# Patient Record
Sex: MALE | Race: WHITE | NOT HISPANIC OR LATINO | Employment: OTHER | URBAN - METROPOLITAN AREA
[De-identification: names, ages, dates, MRNs, and addresses within clinical notes are randomized per-mention and may not be internally consistent; named-entity substitution may affect disease eponyms.]

---

## 2017-04-10 ENCOUNTER — HOSPITAL ENCOUNTER (OUTPATIENT)
Dept: CARDIOLOGY | Facility: HOSPITAL | Age: 62
Discharge: HOME | End: 2017-04-10
Attending: INTERNAL MEDICINE | Admitting: INTERNAL MEDICINE

## 2018-09-12 ENCOUNTER — HOSPITAL ENCOUNTER (OUTPATIENT)
Dept: CARDIOLOGY | Facility: HOSPITAL | Age: 63
Discharge: HOME | End: 2018-09-12
Attending: INTERNAL MEDICINE | Admitting: INTERNAL MEDICINE

## 2018-10-07 ENCOUNTER — HOSPITAL ENCOUNTER (OUTPATIENT)
Dept: SLEEP MEDICINE | Facility: HOSPITAL | Age: 63
Discharge: HOME | End: 2018-10-07
Attending: INTERNAL MEDICINE | Admitting: INTERNAL MEDICINE

## 2019-10-04 PROBLEM — G45.3 AMAUROSIS FUGAX: Status: ACTIVE | Noted: 2017-09-15

## 2019-10-04 PROBLEM — R94.39 ABNORMAL CARDIOVASCULAR STRESS TEST: Status: ACTIVE | Noted: 2017-04-11

## 2019-10-04 PROBLEM — E78.5 HYPERLIPIDEMIA: Status: ACTIVE | Noted: 2017-06-01

## 2019-10-04 PROBLEM — I25.10 CHRONIC CORONARY ARTERY DISEASE: Status: ACTIVE | Noted: 2017-04-21

## 2019-10-04 PROBLEM — I10 HYPERTENSION: Status: ACTIVE | Noted: 2017-06-01

## 2019-10-04 PROBLEM — G47.30 SLEEP APNEA: Status: ACTIVE | Noted: 2019-10-04

## 2019-10-04 PROBLEM — J44.9 CHRONIC OBSTRUCTIVE PULMONARY DISEASE (HCC): Status: ACTIVE | Noted: 2019-10-04

## 2019-10-04 RX ORDER — BUDESONIDE 0.5 MG/2ML
INHALANT ORAL
COMMUNITY
Start: 2013-04-29 | End: 2019-10-07

## 2019-10-04 RX ORDER — NITROGLYCERIN 0.4 MG/1
TABLET SUBLINGUAL
COMMUNITY
Start: 2017-03-10

## 2019-10-04 RX ORDER — ALBUTEROL SULFATE 90 UG/1
AEROSOL, METERED RESPIRATORY (INHALATION)
COMMUNITY
Start: 2017-03-10

## 2019-10-04 RX ORDER — ALBUTEROL SULFATE 2.5 MG/3ML
SOLUTION RESPIRATORY (INHALATION)
COMMUNITY
Start: 2015-12-10

## 2019-10-04 NOTE — PROGRESS NOTES
Subjective:     Encounter Date:10/07/2019      Patient ID: Hemanth Hudson is a 64 y.o. male.    Chief Complaint: Follow-up HTN & Hyperlipidemia  History of Present Illness     64-year-old white male with history of hyperlipidemia hypertension obstructive sleep apnea COPD tobacco abuse presented to my office for a follow-up.       cardiac catheterization April 2017,  which showed a 50-60% disease the circumflex artery and a 99% disease in the RCA.  Patient thereafter underwent a stent placement to the distal RCA with a drug-eluting stent.       Unfortunately patient stopped aspirin and Plavix completely and refused to take them      EKG showed sinus rhythm right bundle branch block     Patient was advised to stop smoking and lose weight for blood pressure control  Patient refusing to take any blood pressure medicines at this time  Patient lost 25 lb   blood pressure is reasonably controlled unfortunately continues to smoke so i again strongly advised him to stop smoking   I again advised him to take aspirin and Plavix he fully understand the stent thrombosis sudden cardiac death myocardial infarction      No past medical history on file.  No past surgical history on file.  There were no vitals taken for this visit.  No family history on file.    Current Outpatient Medications:   •  albuterol (PROVENTIL) (2.5 MG/3ML) 0.083% nebulizer solution, ALBUTEROL SULFATE (2.5 MG/3ML) 0.083% NEBU, Disp: , Rfl:   •  albuterol sulfate HFA (VENTOLIN HFA) 108 (90 Base) MCG/ACT inhaler, VENTOLIN  (90 Base) MCG/ACT AERS, Disp: , Rfl:   •  budesonide (PULMICORT) 0.5 MG/2ML nebulizer solution, BUDESONIDE 0.5 MG/2ML SUSP, Disp: , Rfl:   •  ipratropium (ATROVENT) 0.02 % nebulizer solution, IPRATROPIUM BROMIDE 0.02 % SOLN, Disp: , Rfl:   •  nitroglycerin (NITROSTAT) 0.4 MG SL tablet, NITROSTAT 0.4 MG SUBL, Disp: , Rfl:   •  umeclidinium-vilanterol (ANORO ELLIPTA) 62.5-25 MCG/INH aerosol powder  inhaler, ANORO ELLIPTA 62.5-25  MCG/INH AEPB, Disp: , Rfl:   Social History     Socioeconomic History   • Marital status:      Spouse name: Not on file   • Number of children: Not on file   • Years of education: Not on file   • Highest education level: Not on file     Allergies   Allergen Reactions   • Diphenhydramine Unknown (See Comments)   • Fish Allergy Unknown (See Comments)   • Morphine Unknown (See Comments)   • Pineapple Unknown (See Comments)     ROS           Objective:     Physical Exam    Procedures    Lab Review:       Assessment:         No diagnosis found.       Plan:

## 2019-10-07 ENCOUNTER — OFFICE VISIT (OUTPATIENT)
Dept: CARDIOLOGY | Facility: CLINIC | Age: 64
End: 2019-10-07

## 2019-10-07 VITALS
HEART RATE: 72 BPM | OXYGEN SATURATION: 93 % | BODY MASS INDEX: 38.22 KG/M2 | SYSTOLIC BLOOD PRESSURE: 157 MMHG | HEIGHT: 71 IN | DIASTOLIC BLOOD PRESSURE: 75 MMHG | WEIGHT: 273 LBS

## 2019-10-07 DIAGNOSIS — E78.2 MIXED HYPERLIPIDEMIA: ICD-10-CM

## 2019-10-07 DIAGNOSIS — I10 ESSENTIAL HYPERTENSION: ICD-10-CM

## 2019-10-07 DIAGNOSIS — Z91.14 NON COMPLIANCE W MEDICATION REGIMEN: ICD-10-CM

## 2019-10-07 DIAGNOSIS — I25.10 CHRONIC CORONARY ARTERY DISEASE: Primary | ICD-10-CM

## 2019-10-07 PROCEDURE — 99214 OFFICE O/P EST MOD 30 MIN: CPT | Performed by: INTERNAL MEDICINE

## 2019-10-07 NOTE — PROGRESS NOTES
"    Subjective:     Encounter Date:10/07/2019      Patient ID: Hemanth Hudson is a 64 y.o. male.    Chief Complaint: Follow-up for HTN & CAD  History of Present Illness     64-year-old white male with history of hyperlipidemia hypertension obstructive sleep apnea COPD tobacco abuse presented to my office for a follow-up.       cardiac catheterization April 2017,  which showed a 50-60% disease the circumflex artery and a 99% disease in the RCA.  Patient thereafter underwent a stent placement to the distal RCA with a drug-eluting stent.       Unfortunately patient stopped aspirin and Plavix completely and refused to take them          Patient was advised to stop smoking and lose weight for blood pressure control  Patient refusing to take any blood pressure medicines at this time     blood pressure is reasonably controlled unfortunately continues to smoke so i again strongly advised him to stop smoking   I again advised him to take aspirin and Plavix he fully understand the stent thrombosis sudden cardiac death myocardial infarction      Past Medical History:   Diagnosis Date   • COPD (chronic obstructive pulmonary disease) (CMS/HCC)    • Coronary artery disease    • Depression    • Hypersomnia    • Hypertension    • Neck and shoulder pain    • Osteoarthritis    • Sleep apnea      Past Surgical History:   Procedure Laterality Date   • APPENDECTOMY     • CARDIAC CATHETERIZATION  08/06/2012   • COLONOSCOPY     • CORONARY STENT PLACEMENT  04/21/2017    RCA   • HERNIA REPAIR     • PROSTATE SURGERY     • WRIST SURGERY Right      /75 (BP Location: Left arm, Patient Position: Sitting, Cuff Size: Adult)   Pulse 72   Ht 180.3 cm (71\")   Wt 124 kg (273 lb)   SpO2 93%   BMI 38.08 kg/m²   Family History   Problem Relation Age of Onset   • Aneurysm Mother    • Heart attack Father    • Heart disease Father    • No Known Problems Sister    • No Known Problems Brother    • No Known Problems Maternal Aunt    • No Known " Problems Maternal Uncle    • No Known Problems Paternal Aunt    • No Known Problems Paternal Uncle    • No Known Problems Maternal Grandmother    • Cancer Maternal Grandfather    • No Known Problems Paternal Grandmother    • No Known Problems Paternal Grandfather    • No Known Problems Other    • Anemia Neg Hx    • Arrhythmia Neg Hx    • Asthma Neg Hx    • Clotting disorder Neg Hx    • Fainting Neg Hx    • Heart failure Neg Hx    • Hyperlipidemia Neg Hx    • Hypertension Neg Hx        Current Outpatient Medications:   •  albuterol (PROVENTIL) (2.5 MG/3ML) 0.083% nebulizer solution, ALBUTEROL SULFATE (2.5 MG/3ML) 0.083% NEBU, Disp: , Rfl:   •  albuterol sulfate HFA (VENTOLIN HFA) 108 (90 Base) MCG/ACT inhaler, VENTOLIN  (90 Base) MCG/ACT AERS, Disp: , Rfl:   •  ipratropium (ATROVENT) 0.02 % nebulizer solution, IPRATROPIUM BROMIDE 0.02 % SOLN, Disp: , Rfl:   •  nitroglycerin (NITROSTAT) 0.4 MG SL tablet, NITROSTAT 0.4 MG SUBL, Disp: , Rfl:   •  umeclidinium-vilanterol (ANORO ELLIPTA) 62.5-25 MCG/INH aerosol powder  inhaler, ANORO ELLIPTA 62.5-25 MCG/INH AEPB, Disp: , Rfl:   Social History     Socioeconomic History   • Marital status:      Spouse name: Not on file   • Number of children: Not on file   • Years of education: Not on file   • Highest education level: Not on file   Tobacco Use   • Smoking status: Current Every Day Smoker     Packs/day: 0.50     Types: Cigarettes   • Smokeless tobacco: Never Used   Substance and Sexual Activity   • Alcohol use: Yes   • Drug use: Yes   • Sexual activity: Defer     Allergies   Allergen Reactions   • Diphenhydramine Unknown (See Comments)   • Fish Allergy Unknown (See Comments)   • Morphine Unknown (See Comments)   • Pineapple Unknown (See Comments)     Review of Systems   Constitution: Negative for fever and malaise/fatigue.   HENT: Negative for congestion and hearing loss.    Eyes: Negative for double vision and visual disturbance.   Cardiovascular: Negative  for chest pain, claudication, dyspnea on exertion, leg swelling and syncope.   Respiratory: Negative for cough and shortness of breath.    Endocrine: Negative for cold intolerance.   Skin: Negative for color change and rash.   Musculoskeletal: Negative for arthritis and joint pain.   Gastrointestinal: Negative for abdominal pain and heartburn.   Genitourinary: Negative for hematuria.   Neurological: Negative for excessive daytime sleepiness and dizziness.   Psychiatric/Behavioral: Negative for depression. The patient is not nervous/anxious.    All other systems reviewed and are negative.             Objective:     Physical Exam   Constitutional: He is oriented to person, place, and time. He appears well-developed and well-nourished. He is cooperative.   Obese   HENT:   Head: Normocephalic and atraumatic.   Mouth/Throat: Uvula is midline and oropharynx is clear and moist. No oral lesions.   Eyes: Conjunctivae are normal. No scleral icterus.   Neck: Trachea normal. Neck supple. No JVD present. Carotid bruit is not present. No thyromegaly present.   Cardiovascular: Normal rate, regular rhythm, S1 normal, S2 normal, normal heart sounds, intact distal pulses and normal pulses. PMI is not displaced. Exam reveals no gallop and no friction rub.   No murmur heard.  Pulmonary/Chest: Effort normal and breath sounds normal.   Abdominal: Soft. Bowel sounds are normal.   Musculoskeletal: Normal range of motion.   Neurological: He is alert and oriented to person, place, and time. He has normal strength.   No focal deficits   Skin: Skin is warm. No cyanosis.   Psychiatric: He has a normal mood and affect.       Procedures    Lab Review:       Assessment:          Diagnosis Plan   1. Chronic coronary artery disease     2. Mixed hyperlipidemia     3. Essential hypertension     4. Non compliance w medication regimen            Plan:       Noncompliance with medical care  Patient fully understand all the risks including sudden cardiac  death  Hypertension and dyslipidemia needs aggressive control  I again discussed with the patient at length about the importance of taking medications  He fully understand all the risks

## 2020-08-18 ENCOUNTER — TELEPHONE (OUTPATIENT)
Dept: SURGERY | Facility: CLINIC | Age: 65
End: 2020-08-18

## 2020-08-18 NOTE — TELEPHONE ENCOUNTER
Pt was scheduled for an appointment with  for GERD . Pt stated on telephone when making the appt that he was unable to wear a mask due to breathing problems. We told patient that he would be able to wear a face shield. Upon arriving patient would not put face shield on and was holding it in front of his face.I let pt know that he would have to put face shield on in order to be seen in our office. He declined , I let him know that we would have to cancel his appointment today .     Voiced understanding and patient's appt was cancelled.

## 2021-06-20 ENCOUNTER — HOSPITAL ENCOUNTER (EMERGENCY)
Facility: HOSPITAL | Age: 66
Discharge: HOME | End: 2021-06-20
Attending: EMERGENCY MEDICINE | Admitting: EMERGENCY MEDICINE

## 2021-06-20 ENCOUNTER — APPOINTMENT (OUTPATIENT)
Dept: GENERAL RADIOLOGY | Facility: HOSPITAL | Age: 66
End: 2021-06-20

## 2021-06-20 VITALS
DIASTOLIC BLOOD PRESSURE: 79 MMHG | RESPIRATION RATE: 24 BRPM | WEIGHT: 260 LBS | SYSTOLIC BLOOD PRESSURE: 108 MMHG | BODY MASS INDEX: 36.4 KG/M2 | HEART RATE: 67 BPM | TEMPERATURE: 97.9 F | HEIGHT: 71 IN | OXYGEN SATURATION: 94 %

## 2021-06-20 DIAGNOSIS — R07.9 CHEST PAIN, UNSPECIFIED TYPE: Primary | ICD-10-CM

## 2021-06-20 LAB
ANION GAP SERPL CALCULATED.3IONS-SCNC: 9 MMOL/L (ref 5–15)
BASOPHILS # BLD AUTO: 0.1 10*3/MM3 (ref 0–0.2)
BASOPHILS NFR BLD AUTO: 0.9 % (ref 0–1.5)
BUN SERPL-MCNC: 15 MG/DL (ref 8–23)
BUN/CREAT SERPL: 13 (ref 7–25)
CALCIUM SPEC-SCNC: 8.6 MG/DL (ref 8.6–10.5)
CHLORIDE SERPL-SCNC: 102 MMOL/L (ref 98–107)
CO2 SERPL-SCNC: 27 MMOL/L (ref 22–29)
CREAT SERPL-MCNC: 1.15 MG/DL (ref 0.76–1.27)
DEPRECATED RDW RBC AUTO: 45.1 FL (ref 37–54)
EOSINOPHIL # BLD AUTO: 0 10*3/MM3 (ref 0–0.4)
EOSINOPHIL NFR BLD AUTO: 0.5 % (ref 0.3–6.2)
ERYTHROCYTE [DISTWIDTH] IN BLOOD BY AUTOMATED COUNT: 15.9 % (ref 12.3–15.4)
GFR SERPL CREATININE-BSD FRML MDRD: 64 ML/MIN/1.73
GLUCOSE SERPL-MCNC: 108 MG/DL (ref 65–99)
HCT VFR BLD AUTO: 41.2 % (ref 37.5–51)
HGB BLD-MCNC: 13.8 G/DL (ref 13–17.7)
HOLD SPECIMEN: NORMAL
LYMPHOCYTES # BLD AUTO: 1.9 10*3/MM3 (ref 0.7–3.1)
LYMPHOCYTES NFR BLD AUTO: 20.3 % (ref 19.6–45.3)
MCH RBC QN AUTO: 26.8 PG (ref 26.6–33)
MCHC RBC AUTO-ENTMCNC: 33.4 G/DL (ref 31.5–35.7)
MCV RBC AUTO: 80.4 FL (ref 79–97)
MONOCYTES # BLD AUTO: 0.8 10*3/MM3 (ref 0.1–0.9)
MONOCYTES NFR BLD AUTO: 8.8 % (ref 5–12)
NEUTROPHILS NFR BLD AUTO: 6.5 10*3/MM3 (ref 1.7–7)
NEUTROPHILS NFR BLD AUTO: 69.5 % (ref 42.7–76)
NRBC BLD AUTO-RTO: 0.1 /100 WBC (ref 0–0.2)
PLATELET # BLD AUTO: 294 10*3/MM3 (ref 140–450)
PMV BLD AUTO: 6.8 FL (ref 6–12)
POTASSIUM SERPL-SCNC: 4.6 MMOL/L (ref 3.5–5.2)
RBC # BLD AUTO: 5.13 10*6/MM3 (ref 4.14–5.8)
SODIUM SERPL-SCNC: 138 MMOL/L (ref 136–145)
TROPONIN T SERPL-MCNC: <0.01 NG/ML (ref 0–0.03)
WBC # BLD AUTO: 9.3 10*3/MM3 (ref 3.4–10.8)

## 2021-06-20 PROCEDURE — 84484 ASSAY OF TROPONIN QUANT: CPT | Performed by: EMERGENCY MEDICINE

## 2021-06-20 PROCEDURE — 85025 COMPLETE CBC W/AUTO DIFF WBC: CPT | Performed by: EMERGENCY MEDICINE

## 2021-06-20 PROCEDURE — 80048 BASIC METABOLIC PNL TOTAL CA: CPT | Performed by: EMERGENCY MEDICINE

## 2021-06-20 PROCEDURE — 93005 ELECTROCARDIOGRAM TRACING: CPT | Performed by: EMERGENCY MEDICINE

## 2021-06-20 PROCEDURE — 99283 EMERGENCY DEPT VISIT LOW MDM: CPT

## 2021-06-20 PROCEDURE — 71045 X-RAY EXAM CHEST 1 VIEW: CPT

## 2021-06-20 PROCEDURE — 25010000002 HYDROMORPHONE PER 4 MG: Performed by: EMERGENCY MEDICINE

## 2021-06-20 PROCEDURE — 93005 ELECTROCARDIOGRAM TRACING: CPT

## 2021-06-20 PROCEDURE — 96374 THER/PROPH/DIAG INJ IV PUSH: CPT

## 2021-06-20 RX ORDER — SODIUM CHLORIDE 0.9 % (FLUSH) 0.9 %
10 SYRINGE (ML) INJECTION AS NEEDED
Status: DISCONTINUED | OUTPATIENT
Start: 2021-06-20 | End: 2021-06-20 | Stop reason: HOSPADM

## 2021-06-20 RX ORDER — HYDROMORPHONE HCL 110MG/55ML
0.5 PATIENT CONTROLLED ANALGESIA SYRINGE INTRAVENOUS ONCE
Status: COMPLETED | OUTPATIENT
Start: 2021-06-20 | End: 2021-06-20

## 2021-06-20 RX ADMIN — HYDROMORPHONE HYDROCHLORIDE 0.5 MG: 2 INJECTION, SOLUTION INTRAMUSCULAR; INTRAVENOUS; SUBCUTANEOUS at 10:45

## 2021-06-20 NOTE — ED TRIAGE NOTES
"Pt reports lt shoulder pain \"for awhile\" pt denies any injury/trauma. Does report lt side chest pain with deep breathing that started last night, this is intermittent.  "

## 2021-06-20 NOTE — ED PROVIDER NOTES
Subjective   Patient is a 66-year-old male complaint of sharp pain in his left chest rating to his shoulder.  This developed over the past several days.  The pain is constant and mild without other radiation.  He denies cough fever or increase shortness of breath.  He denies other complaints.          Review of Systems  Negative for headache earache throat cough fever shortness of breath vomiting diarrhea dysuria achiness weight loss or other complaint.  A complete view of systems was obtained and is otherwise negative  Past Medical History:   Diagnosis Date   • COPD (chronic obstructive pulmonary disease) (CMS/[facility])    • Coronary artery disease    • Depression    • Hypersomnia    • Hypertension    • Neck and shoulder pain    • Osteoarthritis    • Sleep apnea        Allergies   Allergen Reactions   • Diphenhydramine Unknown (See Comments)   • Fish Allergy Unknown (See Comments)   • Morphine Unknown (See Comments)   • Pineapple Unknown (See Comments)   • Shellfish-Derived Products GI Intolerance       Past Surgical History:   Procedure Laterality Date   • APPENDECTOMY     • CARDIAC CATHETERIZATION  08/06/2012   • COLONOSCOPY     • CORONARY STENT PLACEMENT  04/21/2017    RCA   • HERNIA REPAIR     • PROSTATE SURGERY     • WRIST SURGERY Right        Family History   Problem Relation Age of Onset   • Aneurysm Mother    • Heart attack Father    • Heart disease Father    • No Known Problems Sister    • No Known Problems Brother    • No Known Problems Maternal Aunt    • No Known Problems Maternal Uncle    • No Known Problems Paternal Aunt    • No Known Problems Paternal Uncle    • No Known Problems Maternal Grandmother    • Cancer Maternal Grandfather    • No Known Problems Paternal Grandmother    • No Known Problems Paternal Grandfather    • No Known Problems Other    • Anemia Neg Hx    • Arrhythmia Neg Hx    • Asthma Neg Hx    • Clotting disorder Neg Hx    • Fainting Neg Hx    • Heart failure Neg Hx    • Hyperlipidemia Neg  Hx    • Hypertension Neg Hx        Social History     Socioeconomic History   • Marital status:      Spouse name: Not on file   • Number of children: Not on file   • Years of education: Not on file   • Highest education level: Not on file   Tobacco Use   • Smoking status: Former Smoker     Packs/day: 0.50     Types: Cigarettes   • Smokeless tobacco: Never Used   Vaping Use   • Vaping Use: Never used   Substance and Sexual Activity   • Alcohol use: Yes   • Drug use: Yes     Types: Marijuana     Comment: daily   • Sexual activity: Defer           Objective   Physical Exam  HEENT exam shows TMs to be clear.  Oropharynx comers but sclerae nonicteric.  Neck has no adenopathy JVD or bruits.  Lungs are clear.  Heart has regular rate rhythm without murmur rub or gallop.  Chest is nontender.  Abdomen is soft nontender peer extremities and is no cyanosis or edema.  Procedures     My EKG interpretation shows normal sinus rhythm with no acute ST change      ED Course      Results for orders placed or performed during the hospital encounter of 06/20/21   Basic Metabolic Panel    Specimen: Blood   Result Value Ref Range    Glucose 108 (H) 65 - 99 mg/dL    BUN 15 8 - 23 mg/dL    Creatinine 1.15 0.76 - 1.27 mg/dL    Sodium 138 136 - 145 mmol/L    Potassium 4.6 3.5 - 5.2 mmol/L    Chloride 102 98 - 107 mmol/L    CO2 27.0 22.0 - 29.0 mmol/L    Calcium 8.6 8.6 - 10.5 mg/dL    eGFR Non African Amer 64 >60 mL/min/1.73    BUN/Creatinine Ratio 13.0 7.0 - 25.0    Anion Gap 9.0 5.0 - 15.0 mmol/L   Troponin    Specimen: Blood   Result Value Ref Range    Troponin T <0.010 0.000 - 0.030 ng/mL   CBC Auto Differential    Specimen: Blood   Result Value Ref Range    WBC 9.30 3.40 - 10.80 10*3/mm3    RBC 5.13 4.14 - 5.80 10*6/mm3    Hemoglobin 13.8 13.0 - 17.7 g/dL    Hematocrit 41.2 37.5 - 51.0 %    MCV 80.4 79.0 - 97.0 fL    MCH 26.8 26.6 - 33.0 pg    MCHC 33.4 31.5 - 35.7 g/dL    RDW 15.9 (H) 12.3 - 15.4 %    RDW-SD 45.1 37.0 - 54.0 fl     MPV 6.8 6.0 - 12.0 fL    Platelets 294 140 - 450 10*3/mm3    Neutrophil % 69.5 42.7 - 76.0 %    Lymphocyte % 20.3 19.6 - 45.3 %    Monocyte % 8.8 5.0 - 12.0 %    Eosinophil % 0.5 0.3 - 6.2 %    Basophil % 0.9 0.0 - 1.5 %    Neutrophils, Absolute 6.50 1.70 - 7.00 10*3/mm3    Lymphocytes, Absolute 1.90 0.70 - 3.10 10*3/mm3    Monocytes, Absolute 0.80 0.10 - 0.90 10*3/mm3    Eosinophils, Absolute 0.00 0.00 - 0.40 10*3/mm3    Basophils, Absolute 0.10 0.00 - 0.20 10*3/mm3    nRBC 0.1 0.0 - 0.2 /100 WBC   ECG 12 Lead   Result Value Ref Range    QT Interval 411 ms     XR Chest 1 View    Result Date: 6/20/2021  No acute cardiopulmonary abnormality or significant change.  Electronically Signed By-Britney Harrell MD On:6/20/2021 10:19 AM This report was finalized on [id number] by  Britney Harrell MD.                                         MDM  Number of Diagnoses or Management Options  Diagnosis management comments: Patient has no evidence of acute coronary syndrome based on EKG and troponin.  Metabolic panel is normal.  There is no evidence of infectious process including pneumonia.  Patient was given morphine IV and feels improved.  He states he does not want to be admitted to the hospital.  He will follow with his cardiologist this week.       Amount and/or Complexity of Data Reviewed  Clinical lab tests: reviewed  Tests in the radiology section of CPT®: reviewed  Tests in the medicine section of CPT®: reviewed    Risk of Complications, Morbidity, and/or Mortality  Presenting problems: high  Diagnostic procedures: high  Management options: high    Patient Progress  Patient progress: stable      Final diagnoses:   Chest pain, unspecified type       ED Disposition  ED Disposition     ED Disposition Condition Comment    Discharge Stable           No follow-up provider specified.       Medication List      No changes were made to your prescriptions during this visit.          Arash Sharma MD  06/20/21 0217

## 2021-06-22 LAB — QT INTERVAL: 411 MS
